# Patient Record
Sex: FEMALE | Race: WHITE | URBAN - METROPOLITAN AREA
[De-identification: names, ages, dates, MRNs, and addresses within clinical notes are randomized per-mention and may not be internally consistent; named-entity substitution may affect disease eponyms.]

---

## 2022-11-15 RX ORDER — SERTRALINE HYDROCHLORIDE 25 MG/1
25 TABLET, FILM COATED ORAL DAILY
COMMUNITY

## 2022-11-15 RX ORDER — ACETAMINOPHEN 500 MG
500 TABLET ORAL EVERY 6 HOURS PRN
COMMUNITY

## 2022-11-17 ENCOUNTER — ANESTHESIA (OUTPATIENT)
Dept: SURGERY | Facility: HOSPITAL | Age: 62
End: 2022-11-17
Payer: MEDICAID

## 2022-11-17 ENCOUNTER — HOSPITAL ENCOUNTER (OUTPATIENT)
Facility: HOSPITAL | Age: 62
Setting detail: HOSPITAL OUTPATIENT SURGERY
Discharge: HOME | End: 2022-11-17
Attending: OPHTHALMOLOGY | Admitting: OPHTHALMOLOGY
Payer: MEDICAID

## 2022-11-17 ENCOUNTER — ANESTHESIA EVENT (OUTPATIENT)
Dept: SURGERY | Facility: HOSPITAL | Age: 62
End: 2022-11-17
Payer: MEDICAID

## 2022-11-17 VITALS
WEIGHT: 290 LBS | BODY MASS INDEX: 48.32 KG/M2 | TEMPERATURE: 98 F | SYSTOLIC BLOOD PRESSURE: 125 MMHG | HEIGHT: 65 IN | HEART RATE: 81 BPM | RESPIRATION RATE: 18 BRPM | OXYGEN SATURATION: 100 % | DIASTOLIC BLOOD PRESSURE: 75 MMHG

## 2022-11-17 DIAGNOSIS — Z01.812 ENCOUNTER FOR PREOPERATIVE SCREENING LABORATORY TESTING FOR COVID-19 VIRUS: Primary | ICD-10-CM

## 2022-11-17 DIAGNOSIS — Z20.822 ENCOUNTER FOR PREOPERATIVE SCREENING LABORATORY TESTING FOR COVID-19 VIRUS: Primary | ICD-10-CM

## 2022-11-17 PROCEDURE — 08RJ3JZ REPLACEMENT OF RIGHT LENS WITH SYNTHETIC SUBSTITUTE, PERCUTANEOUS APPROACH: ICD-10-PCS | Performed by: OPHTHALMOLOGY

## 2022-11-17 DEVICE — TECNIS SMPLCTY TECNIS 1PC CLR MONO 10.0D
Type: IMPLANTABLE DEVICE | Site: EYE | Status: FUNCTIONAL
Brand: TECNIS SIMPLICITY

## 2022-11-17 RX ORDER — SODIUM CHLORIDE, SODIUM LACTATE, POTASSIUM CHLORIDE, CALCIUM CHLORIDE 600; 310; 30; 20 MG/100ML; MG/100ML; MG/100ML; MG/100ML
INJECTION, SOLUTION INTRAVENOUS CONTINUOUS
OUTPATIENT
Start: 2022-11-17

## 2022-11-17 RX ORDER — MIDAZOLAM HYDROCHLORIDE 1 MG/ML
INJECTION INTRAMUSCULAR; INTRAVENOUS AS NEEDED
Status: DISCONTINUED | OUTPATIENT
Start: 2022-11-17 | End: 2022-11-17 | Stop reason: SURG

## 2022-11-17 RX ORDER — SCOLOPAMINE TRANSDERMAL SYSTEM 1 MG/1
1 PATCH, EXTENDED RELEASE TRANSDERMAL ONCE
Status: DISCONTINUED | OUTPATIENT
Start: 2022-11-17 | End: 2022-11-17 | Stop reason: HOSPADM

## 2022-11-17 RX ORDER — NALOXONE HYDROCHLORIDE 0.4 MG/ML
80 INJECTION, SOLUTION INTRAMUSCULAR; INTRAVENOUS; SUBCUTANEOUS AS NEEDED
OUTPATIENT
Start: 2022-11-17 | End: 2022-11-17

## 2022-11-17 RX ORDER — ACETAMINOPHEN 500 MG
1000 TABLET ORAL ONCE
Status: DISCONTINUED | OUTPATIENT
Start: 2022-11-17 | End: 2022-11-17 | Stop reason: HOSPADM

## 2022-11-17 RX ORDER — PREDNISOLONE ACETATE 10 MG/ML
SUSPENSION/ DROPS OPHTHALMIC AS NEEDED
Status: DISCONTINUED | OUTPATIENT
Start: 2022-11-17 | End: 2022-11-17 | Stop reason: HOSPADM

## 2022-11-17 RX ORDER — HYDROMORPHONE HYDROCHLORIDE 1 MG/ML
0.4 INJECTION, SOLUTION INTRAMUSCULAR; INTRAVENOUS; SUBCUTANEOUS EVERY 5 MIN PRN
OUTPATIENT
Start: 2022-11-17 | End: 2022-11-17

## 2022-11-17 RX ORDER — TROPICAMIDE 10 MG/ML
1 SOLUTION/ DROPS OPHTHALMIC SEE ADMIN INSTRUCTIONS
Status: COMPLETED | OUTPATIENT
Start: 2022-11-17 | End: 2022-11-17

## 2022-11-17 RX ORDER — LIDOCAINE HYDROCHLORIDE 10 MG/ML
INJECTION, SOLUTION EPIDURAL; INFILTRATION; INTRACAUDAL; PERINEURAL AS NEEDED
Status: DISCONTINUED | OUTPATIENT
Start: 2022-11-17 | End: 2022-11-17 | Stop reason: HOSPADM

## 2022-11-17 RX ORDER — BALANCED SALT SOLUTION 6.4; .75; .48; .3; 3.9; 1.7 MG/ML; MG/ML; MG/ML; MG/ML; MG/ML; MG/ML
SOLUTION OPHTHALMIC AS NEEDED
Status: DISCONTINUED | OUTPATIENT
Start: 2022-11-17 | End: 2022-11-17 | Stop reason: HOSPADM

## 2022-11-17 RX ORDER — CYCLOPENTOLATE HYDROCHLORIDE 10 MG/ML
1 SOLUTION/ DROPS OPHTHALMIC SEE ADMIN INSTRUCTIONS
Status: COMPLETED | OUTPATIENT
Start: 2022-11-17 | End: 2022-11-17

## 2022-11-17 RX ORDER — TETRACAINE HYDROCHLORIDE 5 MG/ML
1 SOLUTION OPHTHALMIC SEE ADMIN INSTRUCTIONS
Status: COMPLETED | OUTPATIENT
Start: 2022-11-17 | End: 2022-11-17

## 2022-11-17 RX ORDER — PHENYLEPHRINE HCL 2.5 %
1 DROPS OPHTHALMIC (EYE) SEE ADMIN INSTRUCTIONS
Status: COMPLETED | OUTPATIENT
Start: 2022-11-17 | End: 2022-11-17

## 2022-11-17 RX ORDER — HYDROMORPHONE HYDROCHLORIDE 1 MG/ML
0.6 INJECTION, SOLUTION INTRAMUSCULAR; INTRAVENOUS; SUBCUTANEOUS EVERY 5 MIN PRN
OUTPATIENT
Start: 2022-11-17 | End: 2022-11-17

## 2022-11-17 RX ORDER — TETRACAINE HYDROCHLORIDE 5 MG/ML
SOLUTION OPHTHALMIC AS NEEDED
Status: DISCONTINUED | OUTPATIENT
Start: 2022-11-17 | End: 2022-11-17 | Stop reason: HOSPADM

## 2022-11-17 RX ORDER — SODIUM CHLORIDE, SODIUM LACTATE, POTASSIUM CHLORIDE, CALCIUM CHLORIDE 600; 310; 30; 20 MG/100ML; MG/100ML; MG/100ML; MG/100ML
INJECTION, SOLUTION INTRAVENOUS CONTINUOUS
Status: DISCONTINUED | OUTPATIENT
Start: 2022-11-17 | End: 2022-11-17

## 2022-11-17 RX ORDER — HYDROMORPHONE HYDROCHLORIDE 1 MG/ML
0.2 INJECTION, SOLUTION INTRAMUSCULAR; INTRAVENOUS; SUBCUTANEOUS EVERY 5 MIN PRN
OUTPATIENT
Start: 2022-11-17 | End: 2022-11-17

## 2022-11-17 RX ADMIN — MIDAZOLAM HYDROCHLORIDE 1 MG: 1 INJECTION INTRAMUSCULAR; INTRAVENOUS at 07:56:00

## 2022-11-17 RX ADMIN — MIDAZOLAM HYDROCHLORIDE 1 MG: 1 INJECTION INTRAMUSCULAR; INTRAVENOUS at 07:52:00

## 2022-11-17 NOTE — ANESTHESIA POSTPROCEDURE EVALUATION
[address] Patient Status:  Hospital Outpatient Surgery   Age/Gender 58year old female MRN [id number]   [facility] SURGERY Attending Melvin Lopez MD   Hosp Day # 0 PCP PHYSICIAN NONSTAFF       Anesthesia Post-op Note    PHACOEMULSIFICATION OF RIGHT CATARACT WITH PLACEMENT OF INTRAOCULAR LENS IMPLANT    Procedure Summary     Date: 11/17/22 Room / Location: [address] [place] [address] OR 17 / [facility] OR    Anesthesia Start: 8205 Anesthesia Stop:     Procedure: PHACOEMULSIFICATION OF RIGHT CATARACT WITH PLACEMENT OF INTRAOCULAR LENS IMPLANT (Right: Eye) Diagnosis: (CATARACT RIGHT)    Surgeons: Melvin Lopez MD Anesthesiologist: Leatha Barnes MD    Anesthesia Type: MAC ASA Status: 2          Anesthesia Type: MAC    Vitals Value Taken Time   /71 11/17/22 0828   Temp 97.8 11/17/22 0828   Pulse 89 11/17/22 0828   Resp 16 11/17/22 0828   SpO2 98 11/17/22 0828       Patient Location: Same Day Surgery    Anesthesia Type: MAC    Airway Patency: patent    Postop Pain Control: adequate    Mental Status: preanesthetic baseline    Nausea/Vomiting: none    Cardiopulmonary/Hydration status: stable euvolemic    Complications: no apparent anesthesia related complications    Postop vital signs: stable    Dental Exam: Unchanged from Preop    Patient to be discharged from PACU when criteria met.

## 2022-11-17 NOTE — INTERVAL H&P NOTE
Pre-op Diagnosis: CATARACT RIGHT    The above referenced H&P was reviewed by Rosalina Trotter MD on 11/17/2022, the patient was examined and no significant changes have occurred in the patient's condition since the H&P was performed. I discussed with the patient and/or legal representative the potential benefits, risks and side effects of this procedure; the likelihood of the patient achieving goals; and potential problems that might occur during recuperation. I discussed reasonable alternatives to the procedure, including risks, benefits and side effects related to the alternatives and risks related to not receiving this procedure. We will proceed with procedure as planned.

## 2022-11-17 NOTE — DISCHARGE INSTRUCTIONS
[address] [facility]  CATARACT SURGERY POST OPERATIVE INSTRUCTIONS      Call the office, [phone], if you develop severe eye pain, increasing redness, or vision loss. For the first several days, it is normal for the vision to be blurry and to see halos, arcs and starbursts of light. Make sure to wash your hands before using your eye drops and use the drops as instructed. Wait at least 2-5 minutes between each drop. Wear your eye shield while sleeping for 3 nights after surgery. You may use it longer if you are concerned that you may rub the eye or if you sleep with the eye pushing on the pillow. AVOID heavy lifting over 25 pounds and strenuous exercise/activity for 2 weeks after surgery. It is OKAY to use warm water and soft wash cloth to clean the eye drop residue from the eyelashes. DO NOT rub, push, or wipe across your eye. If you have excess tears or drops in your eyes, take a tissue and drape it around your index finger and dab gently on the inside corner of your eye, next to your nose and against the bone. AVOID dirty, siddharth environment/activities such as gardening, cleaning the basement/garage, etc. for at least 1 week after surgery. You may shower and shampoo but avoid water directly hitting or splashing into the eye with force. Be extra gentle around the operated eye. NO swimming, hot tubs, Jacuzzis (pooled water) for at least 2 weeks after surgery. Use swim goggles for at least 1 month after surgery. You may get your hair done and it is okay to use facial makeup, try to avoid eye makeup for at least 1 week after surgery in the operative eye. You may use preservative free artificial tears as needed for dryness, itching, or scratchiness. Remember it takes approximately 4 weeks for your eye to heal and then you are able to see your optometrist for new glasses if needed.                          [facility]  CATARACT SURGERY POST OPERATIVE INSTRUCTIONS - DIABETIC    __________ EYE          WEEK 1 DAY 1 DAY 2 DAY 3 DAY 4 DAY 5 DAY 6 DAY 7   OFLOXACIN O O O O O O O O O O O O O O O O O O O O O O O O O O O O   KETOROLAC O O O O O O O O O O O O O O O O O O O O O O O O O O O O   DEXAMETHASONE O O O O O O O O O O O O O O O O O O O O O O O O O O O O           WEEK 2 DAY 1 DAY 2 DAY 3 DAY 4 DAY 5 DAY 6      DAY 7   OFLOXACIN O O O O O O O O  O O O O  O O O O O O O O O O O O O O O O   KETOROLAC O O O O O O O O O O O O O O O O O O O O O O O O O O O O   DEXAMETHASONE O O O  O O O  O O O O O O  O O O  O O O  O O O            O O O O  breakfast, lunch, dinner, bedtime  O O O breakfast, lunch, dinner  O O   breakfast, dinner  O   breakfast              [facility]  CATARACT SURGERY POST OPERATIVE INSTRUCTIONS - DIABETIC    __________ EYE          WEEK 3 DAY 1 DAY 2 DAY 3 DAY 4 DAY 5 DAY 6   DAY 7   KETOROLAC O O O O O O O O O O O O O O O O O O O O O O O O O O O O   DEXAMETHASONE O O O O O O O O O O O O O O           WEEK 4 DAY 1 DAY 2 DAY 3 DAY 4 DAY 5 DAY 6  DAY 7   KETOROLAC O O O O O O O O O O O O O O O O O O O O O O O O O O O O   DEXAMETHASONE O  O  O  O  O  O  O            O O O O  breakfast, lunch, dinner, bedtime  O O O breakfast, lunch, dinner  O O   breakfast, dinner  O   breakfast

## (undated) DEVICE — ACTIVE FMS W/ INTREPID* ULTRA SLEEVES, 0.9MM 30° ABS* INTREPID* BALANCED TIP: Brand: ALCON

## (undated) DEVICE — WATER STERILE AQUALITE 1000ML

## (undated) DEVICE — PREP BETADINE SOL 5% EYE

## (undated) DEVICE — CLEARCUT® SLIT KNIFE INTREPID MICRO-COAXIAL SYSTEM 2.4 SB: Brand: CLEARCUT®; INTREPID

## (undated) DEVICE — CLEARCUT® SIDEPORT KNIFE DUAL BEVEL 1.0MM ANGLED: Brand: CLEARCUT®

## (undated) DEVICE — STERILE POLYISOPRENE POWDER-FREE SURGICAL GLOVES: Brand: PROTEXIS

## (undated) DEVICE — SINGLE USE MEDICAL DEVICE FOR OPHTHALMIC SURGERY: Brand: SIL. COATED I/A 45 MIL 12/B

## (undated) DEVICE — CATARACT PATIENT CARE KIT

## (undated) DEVICE — BSS BAG CENTURION

## (undated) DEVICE — EYE PACK: Brand: MEDLINE INDUSTRIES, INC.

## (undated) DEVICE — UNFOLDER PLATINUM 1 SERIES CRTRDG 30/BOX: Brand: UNFOLDER PLATINUM 1 SERIES